# Patient Record
Sex: FEMALE | URBAN - METROPOLITAN AREA
[De-identification: names, ages, dates, MRNs, and addresses within clinical notes are randomized per-mention and may not be internally consistent; named-entity substitution may affect disease eponyms.]

---

## 2021-11-04 ENCOUNTER — TELEPHONE (OUTPATIENT)
Dept: NURSING | Facility: CLINIC | Age: 36
End: 2021-11-04

## 2021-11-04 NOTE — TELEPHONE ENCOUNTER
Caller not with pt, she is headed to pt. Caller is from out of town, [facility] pt has no primary provider. Caller seeking [facility] [place], stating [place] [facility] [place].     RN provided addresses of [facility] EDs.     Rica Aldana RN   11/04/21 12:46 PM  [facility] Nurse Advisor